# Patient Record
Sex: MALE | Race: BLACK OR AFRICAN AMERICAN | Employment: UNEMPLOYED | URBAN - METROPOLITAN AREA
[De-identification: names, ages, dates, MRNs, and addresses within clinical notes are randomized per-mention and may not be internally consistent; named-entity substitution may affect disease eponyms.]

---

## 2020-04-07 PROBLEM — F29 UNSPECIFIED PSYCHOSIS NOT DUE TO A SUBSTANCE OR KNOWN PHYSIOLOGICAL CONDITION (HCC): Status: ACTIVE | Noted: 2020-04-07

## 2020-04-08 PROBLEM — J45.20 MILD INTERMITTENT ASTHMA: Status: ACTIVE | Noted: 2020-04-08

## 2020-04-08 PROBLEM — J45.20 MILD INTERMITTENT ASTHMA (HCC): Status: ACTIVE | Noted: 2020-04-08

## 2020-05-04 PROBLEM — F29 PSYCHOSIS (HCC): Status: ACTIVE | Noted: 2020-05-04

## 2020-05-05 PROBLEM — E80.6 HYPERBILIRUBINEMIA: Status: ACTIVE | Noted: 2020-05-05

## 2020-05-07 PROBLEM — F31.32 BIPOLAR AFFECTIVE DISORDER, CURRENTLY DEPRESSED, MODERATE (HCC): Status: ACTIVE | Noted: 2020-05-04

## 2020-05-13 PROBLEM — F31.64 BIPOLAR DISORDER, CURR EPISODE MIXED, SEVERE, WITH PSYCHOTIC FEATURES (HCC): Status: ACTIVE | Noted: 2020-05-13

## 2020-05-17 PROBLEM — F31.9 BIPOLAR 1 DISORDER (HCC): Status: ACTIVE | Noted: 2020-05-17

## 2020-05-17 NOTE — BH PROGRESS NOTE
Pt presented to [facility] with mother for assessment d/t pt reporting he wanted his medications looked at. Upon arrival to [facility], pt mother stated she would like to take pt to ER for evaluation and not be assessed at [facility]. Pt nor pt mother denied safety concerns.  E

## 2020-05-18 PROBLEM — E80.6 HYPERBILIRUBINEMIA: Status: RESOLVED | Noted: 2020-05-05 | Resolved: 2020-05-18

## 2020-05-18 NOTE — ED PROVIDER NOTES
Patient Seen in: [facility] Emergency Department      History   Patient presents with:  Eval-P    Stated Complaint:     HPI     Patient is a 79-year-old male who recently admitted to [facility].  Patient states he had been prescribed Zypre motion, no tenderness, good capillary refill. SKIN: No rash, good turgor. NEURO: Patient answers questions appropriately. No focal deficits appreciated.   Conversant         ED Course     Labs Reviewed   URINALYSIS WITH CULTURE REFLEX - Abnormal; Notable

## 2020-05-18 NOTE — BH LEVEL OF CARE ASSESSMENT
Level of Care Assessment Note    General Questions  Why are you here?: \"I'm here for suicidal thoughts. I'm scared of my thought\"  Precipitating Events: Pt reported thoughts to kill himself for the last few day.  Pt reported that he believes that it was t reported he is a fighter and wants to live. Past Suicidal Ideation: Ideation  Describe: Pt reported hx of suicidal thoughts prior to being prescribed depakote but feels the medication made it worse.   Family History or Personal Lived Experience of Loss or Symptoms: Panic attack; Shaking;Palpitations  Panic Attacks: Pt reported panic attacks multiple time per week.   Trauma Reaction: Flashbacks(Pt reported PTSD.)  Bipolar Symptoms: Increased risk taking  Bipolar Description: Pt reported I don't know \"not list Symptoms: Denies past symptoms  Last Withdrawal Episode: n/a  Current Withdrawal Symptoms: No  Breathalyzer: (BAL 0)    Compulsive Behaviors  Are you/others concerned about any of the following behaviors over the past 30 days?: Other (comment)(Pt reported Co-operative  Speech  Rate of Speech: Appropriate  Flow of Speech: Appropriate  Intensity of Volume: Ordinary  Clarity: Clear  Cognition  Concentration: Unimpaired  Memory: Recent memory intact; Remote memory intact  Orientation Level: Oriented X4  Insight: receptive to the recommendation for inpatient treatment. Risk/Protective Factors  Risk Factors: Current suicidal behavior;Current/past psychiatric disorder;Access to lethal means; Change in treatment;Substance use or abuse;Pattern of impulsive decision m

## 2020-05-18 NOTE — ED NOTES
Patient remains calm and cooperative.   EMS here for transport to [facility].  VSS, stable for transport

## 2020-05-24 ENCOUNTER — APPOINTMENT (OUTPATIENT)
Dept: GENERAL RADIOLOGY | Facility: HOSPITAL | Age: 25
End: 2020-05-24
Attending: EMERGENCY MEDICINE
Payer: COMMERCIAL

## 2020-05-24 ENCOUNTER — APPOINTMENT (OUTPATIENT)
Dept: CT IMAGING | Facility: HOSPITAL | Age: 25
End: 2020-05-24
Attending: EMERGENCY MEDICINE
Payer: COMMERCIAL

## 2020-05-24 ENCOUNTER — HOSPITAL ENCOUNTER (EMERGENCY)
Facility: HOSPITAL | Age: 25
Discharge: HOME | End: 2020-05-24
Attending: EMERGENCY MEDICINE
Payer: COMMERCIAL

## 2020-05-24 VITALS
BODY MASS INDEX: 22 KG/M2 | DIASTOLIC BLOOD PRESSURE: 70 MMHG | TEMPERATURE: 98 F | OXYGEN SATURATION: 100 % | RESPIRATION RATE: 18 BRPM | WEIGHT: 145 LBS | HEART RATE: 77 BPM | SYSTOLIC BLOOD PRESSURE: 104 MMHG

## 2020-05-24 DIAGNOSIS — R51.9 ACUTE NONINTRACTABLE HEADACHE, UNSPECIFIED HEADACHE TYPE: Primary | ICD-10-CM

## 2020-05-24 PROCEDURE — 81003 URINALYSIS AUTO W/O SCOPE: CPT | Performed by: EMERGENCY MEDICINE

## 2020-05-24 PROCEDURE — 82550 ASSAY OF CK (CPK): CPT | Performed by: EMERGENCY MEDICINE

## 2020-05-24 PROCEDURE — 80053 COMPREHEN METABOLIC PANEL: CPT | Performed by: EMERGENCY MEDICINE

## 2020-05-24 PROCEDURE — 70450 CT HEAD/BRAIN W/O DYE: CPT | Performed by: EMERGENCY MEDICINE

## 2020-05-24 PROCEDURE — 99284 EMERGENCY DEPT VISIT MOD MDM: CPT

## 2020-05-24 PROCEDURE — 96374 THER/PROPH/DIAG INJ IV PUSH: CPT

## 2020-05-24 PROCEDURE — 85025 COMPLETE CBC W/AUTO DIFF WBC: CPT | Performed by: EMERGENCY MEDICINE

## 2020-05-24 PROCEDURE — 71045 X-RAY EXAM CHEST 1 VIEW: CPT | Performed by: EMERGENCY MEDICINE

## 2020-05-24 PROCEDURE — 96361 HYDRATE IV INFUSION ADD-ON: CPT

## 2020-05-24 PROCEDURE — 96375 TX/PRO/DX INJ NEW DRUG ADDON: CPT

## 2020-05-24 RX ORDER — LORAZEPAM 2 MG/ML
1 INJECTION INTRAMUSCULAR ONCE
Status: COMPLETED | OUTPATIENT
Start: 2020-05-24 | End: 2020-05-24

## 2020-05-24 RX ORDER — DIPHENHYDRAMINE HYDROCHLORIDE 50 MG/ML
25 INJECTION INTRAMUSCULAR; INTRAVENOUS ONCE
Status: COMPLETED | OUTPATIENT
Start: 2020-05-24 | End: 2020-05-24

## 2020-05-24 RX ORDER — METOCLOPRAMIDE HYDROCHLORIDE 5 MG/ML
10 INJECTION INTRAMUSCULAR; INTRAVENOUS ONCE
Status: COMPLETED | OUTPATIENT
Start: 2020-05-24 | End: 2020-05-24

## 2020-05-24 RX ORDER — KETOROLAC TROMETHAMINE 30 MG/ML
15 INJECTION, SOLUTION INTRAMUSCULAR; INTRAVENOUS ONCE
Status: COMPLETED | OUTPATIENT
Start: 2020-05-24 | End: 2020-05-24

## 2020-05-25 NOTE — ED NOTES
Pt seen restless in bed, pressured speech, hyperventilating, constant leg movements,  \"I can't stay still, my pain is 100. \" Pt given Benadryl as ordered

## 2020-05-25 NOTE — ED PROVIDER NOTES
Patient Seen in: [facility] Emergency Department      History   Patient presents with:  Headache  Nausea/Vomiting/Diarrhea    Stated Complaint: Headache; Nausea    HPI    Presents with headache.   The patient states that he has had headaches for a lo following components:       Result Value    Glucose 102 (*)     All other components within normal limits   CBC W/ DIFFERENTIAL - Abnormal; Notable for the following components:    HGB 12.5 (*)     MCV 78.2 (*)     MCH 23.9 (*)     MCHC 30.6 (*)     All ot Margarita Johnson MD on 5/24/2020 at 10:57 PM          Xr Chest Ap Portable  (cpt=71045)    Result Date: 5/24/2020  PROCEDURE:  XR CHEST AP PORTABLE  (CPT=71045)  TECHNIQUE:  AP chest radiograph was obtained. COMPARISON:  None.   INDICATIONS:  cough  PATIENT STATED H diagnosis)    Disposition:  Discharge  5/24/2020 11:31 pm    Follow-up:  Vadim Bryant MD  [address] [place]    Schedule an appointment as soon as possible for a visit in 2 days      Stephanie Vargas MD

## 2020-05-25 NOTE — ED NOTES
Pt states, \"it's not working, I still can't stay still. I get panic attacks like this everyday. \" IV Ativan given as ordered

## 2020-05-26 NOTE — ED NOTES
Pt presented to the ED due to concerns of increased anxiety. Pt denies SI/HI. Spoke with pt's mother who reports concerns that pt is frequently anxious and wanting to come to the ED. She reported pt has not slept since his last visit to the ED.  Pt's mother

## 2020-05-26 NOTE — ED NOTES
Spoke with ANA, patient okay to be discharged home. Per MD patient is okay to go home. Discharge instructions reviewed with patient and patient's mother. Patient will follow up with PHP. Patient given prescription.  Patient is cooperative, denies any SI/HI,

## 2020-05-26 NOTE — ED PROVIDER NOTES
Patient Seen in: [facility] Emergency Department      History   Patient presents with:   Anxiety/Panic attack    Stated Complaint: anxiety    HPI    43-year-old male diagnosed bipolar recently discharged last Tuesday from [facility] after admission noted in HPI. Constitutional and vital signs reviewed. All other systems reviewed and negative except as noted above.     Physical Exam     ED Triage Vitals [05/25/20 1950]   /72   Pulse 79   Resp 18   Temp 98.7 °F (37.1 °C)   Temp src Temporal last 2 partial hospitalization program appointments, and discussing with the psych liaison that it is there where he would have had his anxiety addressed rather than here in the ED today, went back and discussed with mom if they are agreeable to go tomorro

## 2020-05-26 NOTE — ED NOTES
Per pts mother, patient received lorazapam yesterday which helped his anxiety and helped him sleep but that psychiatrist wants to see patient for a follow up appointment prior to prescribing medication.

## 2020-05-26 NOTE — ED NOTES
Per MD discharge on hold until patient is reassessed by [facility].

## 2020-05-26 NOTE — ED INITIAL ASSESSMENT (HPI)
Patient was discharged from [facility] 4 days ago, is currently in the day program. Patient was placed on Depakote and patient felt sick on the medication so took himself off the medication.  Patient reports that his anxiety is bad and is causing him a headache 10

## 2020-05-26 NOTE — ED NOTES
Patient's mother requesting for a recommendation for a medication to help patient sleep tonight and that patient will then follow up with [facility] PHP tomorrow.

## 2020-05-26 NOTE — ED NOTES
Updated MD regarding what was discussed with patient's mother that patient has very bad anxiety and that lorazapam helped him but that she is unable to get a prescription for it as psychiatrist wants a follow up appointment first however it is difficult to

## 2021-04-09 DIAGNOSIS — Z23 NEED FOR VACCINATION: ICD-10-CM

## 2021-06-20 NOTE — ED PROVIDER NOTES
Patient is calm and cooperative.   We are waiting official transport to [facility]

## 2021-06-20 NOTE — ED QUICK NOTES
Pt changed into hospital gown and socks. All belongings bagged and labeled. Pt moved to front of nursing station to be monitored.

## 2021-06-20 NOTE — ED NOTES
Pt accepted to [facility] under Dr. Simin Angulo. Will be calling for RN to RN within the next hour or two.

## 2021-06-20 NOTE — ED QUICK NOTES
Report received from [facility]. Pt asleep and appears in no distress. Arturo Dominguez  (RN)  2020 17:16:33

## 2021-06-20 NOTE — ED NOTES
Spoke with Hanny Mcdonald at [facility] who stated that she was able speak with Nurse and transferred her call so that RN to RN can take place.
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
## 2021-06-20 NOTE — ED NOTES
Spoke with Gregg Concepcion to inform him that there is no bed availability at [facility] and that he will be transferred to a different hospital for in-patient. Discharge  informed Gregg Concepcion that he will be updated on transfer status.  Bronson expressed u

## 2021-06-20 NOTE — ED QUICK NOTES
Nurse attempt to call sheila. Spoke with Adriana Calloway. Nurse to nurse still unavailable due to emergencies on the unit.

## 2021-06-20 NOTE — PROGRESS NOTES
06/20/21 0025   Advance Directives for Healthcare   Does the patient have:   Other (Comment)  (Catarina Azar states he doesn't know if he has a Legal Guardian; none noted in previous assessments)   Healthcare Agent Appointed Keely Box denies having a Health
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
## 2021-06-20 NOTE — ED QUICK NOTES
Bhavesh Stanley called and notified nurse to call in 30minutes due to nurse at [facility] unable to take report at this time.

## 2021-06-20 NOTE — ED QUICK NOTES
Report to Mireille Vanegas RN. Pt remains asleep and in no distress. Per Dr. Nallely Alcaraz, pt has been accepted at [facility] and will be transferred there later today when bed is available.

## 2021-06-20 NOTE — PROGRESS NOTES
06/20/21 0026   COVID Exposure Risk Screening   Have you been practicing social distancing? Yes   Have you been wearing a mask when in the community?  No   Describe Bronson states he doesn't wear a mask   Are the people you live with following social dis

## 2021-06-20 NOTE — ED NOTES
Spoke with Bronson to inform him of acceptance to [facility] and waiting on RN to RN to be completed.

## 2021-06-20 NOTE — ED NOTES
Spoke with Lenoard Leyden at [facility] who stated that their Nurse will be calling for RN to RN within the next 15 minutes. Lenoard Leyden stated that the Nurse was responding to an emergency on a unit.

## 2021-06-20 NOTE — ED PROVIDER NOTES
Patient Seen in: [facility] Emergency Department      History   Patient presents with:  Eval-P    Stated Complaint: sore throat    HPI/Subjective:   HPI    Patient is a 43-year-old male who reports that he has been diagnosed with schizophrenia in th Exam  Vitals and nursing note reviewed. Constitutional:       Appearance: He is well-developed. HENT:      Head: Normocephalic and atraumatic.    Eyes:      Conjunctiva/sclera: Conjunctivae normal.      Pupils: Pupils are equal, round, and reactive to l Amphetamine Urine Presumed Positive (*)     Cannabinoid Urine Presumed Positive (*)     Ecstasy Urine Presumed Positive (*)     All other components within normal limits    Narrative:     Results of the Urine Drug Screen should be used only for medical pur Abnormality         Status                     ---------                               -----------         ------                     CBC W/ DIFFERENTIAL[[id number]]          Abnormal            Final result                 Please view resu here.                     Disposition and Plan     Clinical Impression:  Bipolar affective disorder, remission status unspecified ([facility]  (primary encounter diagnosis)  Paranoia ([facility]     Disposition:  Psychiatric transfer  6/20/2021  1:54 am    Follow-up:

## 2021-06-20 NOTE — ED QUICK NOTES
[facility] at [facility]. Pt cleared for discharge. Pt calm and cooperative. Belongings returned to paramedics.

## 2021-06-20 NOTE — ED PROVIDER NOTES
Spoke with Loreto Arana at [facility] who stated that RN to RN will be completed within the next 5 minutes

## 2021-06-20 NOTE — ED NOTES
Spoke with Nurse who stated that [facility] called for RN to RN. Merrick needed to call back due to responding to a code.

## 2021-11-02 ENCOUNTER — TELEPHONE (OUTPATIENT)
Dept: BEHAVIORAL HEALTH | Age: 26
End: 2021-11-02

## 2021-11-02 ENCOUNTER — OFFICE VISIT (OUTPATIENT)
Dept: URGENT CARE | Age: 26
End: 2021-11-02

## 2021-11-02 VITALS
OXYGEN SATURATION: 97 % | DIASTOLIC BLOOD PRESSURE: 71 MMHG | SYSTOLIC BLOOD PRESSURE: 144 MMHG | RESPIRATION RATE: 16 BRPM | TEMPERATURE: 98.1 F | HEART RATE: 69 BPM

## 2021-11-02 DIAGNOSIS — Z76.0 ENCOUNTER FOR MEDICATION REFILL: ICD-10-CM

## 2021-11-02 DIAGNOSIS — F90.9 ATTENTION DEFICIT HYPERACTIVITY DISORDER (ADHD), UNSPECIFIED ADHD TYPE: Primary | ICD-10-CM

## 2021-11-02 PROCEDURE — 99214 OFFICE O/P EST MOD 30 MIN: CPT | Performed by: EMERGENCY MEDICINE

## 2021-11-02 ASSESSMENT — PAIN SCALES - GENERAL: PAINLEVEL: 0

## 2021-11-05 ENCOUNTER — TELEPHONE (OUTPATIENT)
Dept: FAMILY MEDICINE | Age: 26
End: 2021-11-05

## 2022-01-26 ENCOUNTER — APPOINTMENT (OUTPATIENT)
Dept: BEHAVIORAL HEALTH | Age: 27
End: 2022-01-26

## 2022-02-23 ENCOUNTER — APPOINTMENT (OUTPATIENT)
Dept: BEHAVIORAL HEALTH | Age: 27
End: 2022-02-23

## 2024-02-25 ENCOUNTER — HOSPITAL ENCOUNTER (EMERGENCY)
Facility: HOSPITAL | Age: 29
Discharge: HOME | End: 2024-02-25
Attending: EMERGENCY MEDICINE
Payer: COMMERCIAL

## 2024-02-25 ENCOUNTER — HOSPITAL ENCOUNTER (EMERGENCY)
Age: 29
Discharge: LEFT BEFORE DISCHARGE | End: 2024-02-25
Payer: COMMERCIAL

## 2024-02-25 VITALS
HEART RATE: 86 BPM | SYSTOLIC BLOOD PRESSURE: 122 MMHG | BODY MASS INDEX: 31.5 KG/M2 | HEIGHT: 70 IN | TEMPERATURE: 98 F | OXYGEN SATURATION: 98 % | DIASTOLIC BLOOD PRESSURE: 92 MMHG | RESPIRATION RATE: 16 BRPM | WEIGHT: 220 LBS

## 2024-02-25 DIAGNOSIS — F25.9 SCHIZOAFFECTIVE DISORDER, UNSPECIFIED TYPE (HCC): ICD-10-CM

## 2024-02-25 DIAGNOSIS — R44.3 HALLUCINATIONS: Primary | ICD-10-CM

## 2024-02-25 LAB
ALBUMIN SERPL-MCNC: 3.9 G/DL (ref 3.4–5)
ALBUMIN/GLOB SERPL: 1 {RATIO} (ref 1–2)
ALP LIVER SERPL-CCNC: 87 U/L
ALT SERPL-CCNC: 49 U/L
AMPHET UR QL SCN: NEGATIVE
ANION GAP SERPL CALC-SCNC: 3 MMOL/L (ref 0–18)
APAP SERPL-MCNC: <2 UG/ML (ref 10–30)
AST SERPL-CCNC: 39 U/L (ref 15–37)
ATRIAL RATE: 77 BPM
BASOPHILS # BLD AUTO: 0.05 X10(3) UL (ref 0–0.2)
BASOPHILS NFR BLD AUTO: 0.7 %
BENZODIAZ UR QL SCN: NEGATIVE
BILIRUB SERPL-MCNC: 0.7 MG/DL (ref 0.1–2)
BUN BLD-MCNC: 12 MG/DL (ref 9–23)
CALCIUM BLD-MCNC: 9.5 MG/DL (ref 8.5–10.1)
CHLORIDE SERPL-SCNC: 107 MMOL/L (ref 98–112)
CO2 SERPL-SCNC: 27 MMOL/L (ref 21–32)
COCAINE UR QL: NEGATIVE
CREAT BLD-MCNC: 0.75 MG/DL
CREAT UR-SCNC: 161 MG/DL
EGFRCR SERPLBLD CKD-EPI 2021: 126 ML/MIN/1.73M2 (ref 60–?)
EOSINOPHIL # BLD AUTO: 0.17 X10(3) UL (ref 0–0.7)
EOSINOPHIL NFR BLD AUTO: 2.4 %
ERYTHROCYTE [DISTWIDTH] IN BLOOD BY AUTOMATED COUNT: 13.1 %
ETHANOL SERPL-MCNC: <3 MG/DL (ref ?–3)
GLOBULIN PLAS-MCNC: 4.1 G/DL (ref 2.8–4.4)
GLUCOSE BLD-MCNC: 97 MG/DL (ref 70–99)
HCT VFR BLD AUTO: 43.5 %
HGB BLD-MCNC: 13.5 G/DL
IMM GRANULOCYTES # BLD AUTO: 0.05 X10(3) UL (ref 0–1)
IMM GRANULOCYTES NFR BLD: 0.7 %
LYMPHOCYTES # BLD AUTO: 2.88 X10(3) UL (ref 1–4)
LYMPHOCYTES NFR BLD AUTO: 41.4 %
MCH RBC QN AUTO: 23.6 PG (ref 26–34)
MCHC RBC AUTO-ENTMCNC: 31 G/DL (ref 31–37)
MCV RBC AUTO: 76 FL
MDMA UR QL SCN: NEGATIVE
MONOCYTES # BLD AUTO: 0.48 X10(3) UL (ref 0.1–1)
MONOCYTES NFR BLD AUTO: 6.9 %
NEUTROPHILS # BLD AUTO: 3.32 X10 (3) UL (ref 1.5–7.7)
NEUTROPHILS # BLD AUTO: 3.32 X10(3) UL (ref 1.5–7.7)
NEUTROPHILS NFR BLD AUTO: 47.9 %
OPIATES UR QL SCN: NEGATIVE
OSMOLALITY SERPL CALC.SUM OF ELEC: 284 MOSM/KG (ref 275–295)
OXYCODONE UR QL SCN: NEGATIVE
P AXIS: 28 DEGREES
P-R INTERVAL: 182 MS
PLATELET # BLD AUTO: 302 10(3)UL (ref 150–450)
POTASSIUM SERPL-SCNC: 4.1 MMOL/L (ref 3.5–5.1)
PROT SERPL-MCNC: 8 G/DL (ref 6.4–8.2)
Q-T INTERVAL: 362 MS
QRS DURATION: 82 MS
QTC CALCULATION (BEZET): 409 MS
R AXIS: -8 DEGREES
RBC # BLD AUTO: 5.72 X10(6)UL
SALICYLATES SERPL-MCNC: <1.7 MG/DL (ref 2.8–20)
SODIUM SERPL-SCNC: 137 MMOL/L (ref 136–145)
T AXIS: 1 DEGREES
VENTRICULAR RATE: 77 BPM
WBC # BLD AUTO: 7 X10(3) UL (ref 4–11)

## 2024-02-25 PROCEDURE — 80143 DRUG ASSAY ACETAMINOPHEN: CPT | Performed by: EMERGENCY MEDICINE

## 2024-02-25 PROCEDURE — 93010 ELECTROCARDIOGRAM REPORT: CPT

## 2024-02-25 PROCEDURE — 36415 COLL VENOUS BLD VENIPUNCTURE: CPT

## 2024-02-25 PROCEDURE — 80307 DRUG TEST PRSMV CHEM ANLYZR: CPT | Performed by: EMERGENCY MEDICINE

## 2024-02-25 PROCEDURE — 93005 ELECTROCARDIOGRAM TRACING: CPT

## 2024-02-25 PROCEDURE — 80053 COMPREHEN METABOLIC PANEL: CPT | Performed by: EMERGENCY MEDICINE

## 2024-02-25 PROCEDURE — 85025 COMPLETE CBC W/AUTO DIFF WBC: CPT | Performed by: EMERGENCY MEDICINE

## 2024-02-25 PROCEDURE — 99285 EMERGENCY DEPT VISIT HI MDM: CPT

## 2024-02-25 PROCEDURE — 99284 EMERGENCY DEPT VISIT MOD MDM: CPT

## 2024-02-25 PROCEDURE — 82077 ASSAY SPEC XCP UR&BREATH IA: CPT | Performed by: EMERGENCY MEDICINE

## 2024-02-25 PROCEDURE — 80179 DRUG ASSAY SALICYLATE: CPT | Performed by: EMERGENCY MEDICINE

## 2024-02-25 RX ORDER — LORAZEPAM 1 MG/1
1 TABLET ORAL ONCE
Status: COMPLETED | OUTPATIENT
Start: 2024-02-25 | End: 2024-02-25

## 2024-02-25 RX ORDER — ARIPIPRAZOLE 10 MG/1
15 TABLET ORAL DAILY
COMMUNITY
Start: 2024-02-14

## 2024-02-25 NOTE — ED INITIAL ASSESSMENT (HPI)
Patient here with c/o hallucinations.  Per mother, patient has hx of psychosis.  Patient was reporting passing out and blood clots/swelling in his legs.  Mother reports no one has seen her pass out.  Patient did drink alcohol and smoke marijuana  last night and is concerned that may have been interfering with with his medication.  Denies SI/HI.

## 2024-02-25 NOTE — ED QUICK NOTES
Per Dr Lopez, no Petition or Cert or seclusion documentation necessary. Pt is here to be medically cleared due to medication change.

## 2024-02-25 NOTE — ED QUICK NOTES
Patient and parents updated on POC. Offered breakfast to patient, declined at this time. Informed mom of assessment ETA.

## 2024-02-25 NOTE — DISCHARGE INSTRUCTIONS
Therapy resources:    Jia Meredith  Therapist ([employer] [facility] [facility])  [address] [place] [address], [address], [place], [place], [address]  [email or web address]  [phone]. Extension: [phone]      Smith Alcantar  Therapist ([facility])  [address], [facility], [place], [address]  [email or web address]  [phone]. Extension: [phone]      Harrison Geller  Therapist ([facility])  [address] [place] [address], [address], [place], [place], [address]  [email or web address]  [phone]. Extension:

## 2024-02-25 NOTE — BH LEVEL OF CARE ASSESSMENT
Crisis Evaluation Assessment    Bronson Pinzon YOB: 1995   Age 29 year old MRN [id number]   [facility] EMERGENCY DEPARTMENT Attending Colt Lopez MD      Patient's legal sex: male  Patient identifies as: male  Patient's birth sex: male  Preferred pronouns: HE/Him    Date of Service: 2/25/2024    Referral Source:  Referral Source  Where was crisis eval performed?: On-site  Referral Source: Friend/Relative  Referral Source Info: Parents brought this pt to ED for medical clearance    Reason for Crisis Evaluation   \"Hallucinating\", Pt admits taking his psych medications with alcohol and marijuana last night. Pt reports to have a hx of hallucinating every single day. Pt reports hallucinating more than usual last night. Pt denies commanding hallucinations.       Collateral  Pt's both parents were at bedside and provided collateral.   Per mother, pt has a hx of Bipolar do, Schizophrenia, ADHD and PTSD.  My son is struggling with mental health issues for the past 7 years. He has a history of hallucinating, he hallucinates every single day and I am well aware of taking care of his needs at home. He has started seeing a new psychiatrist and she has changed some of his medications, roughly 2 weeks ago. Last night I noticed him hallucinating more than usual.  I got concerned because I thought may be because of new medications he is having increased hallucinations and probably there could be any other medical concern. He told me he passed out last night, I did not see him passing out. I thought he may have blood clots caused by new meds that is why I brought him to ED today for medical checkup. I am not concerned about his mental health and I am completely taking care of him at home. He has been compliant with his prescribed medications currently and seeing Lidia BAILEY at [facility]. He has a hx of being non complaint with his treatment in past and sometimes it could be very challenging and a struggle  to make sure he is taking his medications. He will be seeing the psychiatrist again February 27, 2028.   After looking at his labs I was surprised that he was positive for alcohol and marijuana which makes sense why he was having increased hallucinations.  I wish I knew that he was using alcohol and marijuana with his psych meds before bringing him over to ED.   He does not have history of depression, anxiety, HI or SI. At times, he does get verbally aggressive but he is not physically aggressive towards property or any of us. He does not have commanding hallucinations and easily gets paranoid.   He has a history of inpatient mental health treatment and IOP few years ago for psychosis. He also has a history of Adderall addiction. He is unable to carry a job or perform his ADLs independently but I am available to help him at home. His appetite and sleep varies. No signs of current psychosis.   I do not have any safety concerns to bring him back home today after his medical clearance.        Suicide Crisis Syndrome:  Suicide Crisis Syndrome  Do you feel trapped with no good options left?: No  Are you overwhelmed, or have you lost control by negative thoughts filling your head? : No    Suicide Risk Screening:  Source of information for CSSR: Patient;Collateral  In what setting is the screener performed?: in person  1. Have you wished you were dead or wished you could go to sleep and not wake up? (past 30 days): No  2. Have you actually had any thoughts of killing yourself? (past 30 days): No  3. Have you been thinking about how you might kill yourself? (past 30 days): No  4. Have you had these thoughts and had some intention of acting on them? (past 30 days): No  5a. Have you started to work out or worked out the details of how to kill yourself? (past 30 days): No  5b. Do you intend to carry out this plan? (past 30 days): No  6. Have you ever done anything, started to do anything, or prepared to do anything to end your  life? (lifetime): No     Score - BH OV: No Risk    Suicide Risk Assessments:  Suicidal Thoughts, Plan and Intent (this information to be used in conjunction with CSSR-S Suicide Screening)  Describe thoughts, ideation and intent:: Pt and parents at bedside deny SI/attempts/thoughts.        Non-Suicidal Self-Injury:   Patient denies any nonsuicidal self-injury.    Risk to Others  Patient denies HI or aggression towards others.  Per mother patient has a tendency to get verbally aggressive towards others but he has never been physically aggressive towards others or property. He has never done any damages to property.    Access to Means:  Access to Means  Has access to means to attempt suicide, self-injure, harm others, or damage property?: No  Access to Firearm/Weapon: No  Do you have a firearm owner identification (FOID) card?: No    Protective Factors:        Review of Psychiatric Systems:  Per mother patient has a history of schizophrenia ADHD bipolar I disorder and PTSD.  He has history of hallucinating every single day but none of them are commanding hallucinations. He does not have a history of depression, anxiety or any current psychosis.  He has issues with his sleep off and on.  No issues with appetite.      Substance Use:  Per labs pt is + for cannabis.  Pt admits taking his psych medications with alcohol and marijuana last night.   Per mother pt has a hx of abusing adderall.       Functional Achievement:   Per mother patient is unemployed and lives with her at home. Patient needs help with his ADLs.      Ability to Care for Self::   Per mother patient does have off-and-on ability to care for self.      Current Treatment and Treatment History:  Per mother patient does have a history of inpatient and outpatient mental health treatment.  He is seeing a psychiatrist nurse practitioner Pauline Murillo at [place], next appointment is on February 27.    School/Work Performance:  Per mother due to his mental health he is  unable to maintain a job and his duties.      Relevant Social History:  Per mother patient lives with her full-time and she is not aware of any legal history.      Simone and Complex (as applicable):      Current Medical (as applicable):       EDP Assessment (as applicable):  IBW Calculations  Weight: 220 lb  BMI (Calculated): 31.6  IBW LBS [place]: 166 LBS  IBW %: 132.53 %  IBW + 10%: 182.6 LBS  IBW - 10%: 149.4 LBS       Abuse Assessment:  Abuse Assessment  Physical Abuse: Denies  Verbal Abuse: Denies  Sexual Abuse: Denies  Neglect: Denies  Does anyone say or do something to you that makes you feel unsafe?: No  Have You Ever Been Harmed by a Partner/Caregiver?: No  Health Concerns r/t Abuse: No    Mental Status Exam:   General Appearance  Characteristics: Appropriate clothing  Eye Contact: No contact  Psychomotor Behavior  Gait/Movement: Normal  Abnormal movements: None  Posture: Relaxed  Rate of Movement: Normal  Mood and Affect  Mood or Feelings: Calm  Range of Affect: Flat  Stability of Affect: Stable  Attitude toward staff: Co-operative  Speech  Rate of Speech: Appropriate  Flow of Speech: Appropriate  Intensity of Volume: Ordinary  Clarity: Clear  Cognition  Concentration: Unimpaired  Orientation Level: Oriented to person;Oriented to place;Oriented to time  Insight: Poor  Judgment: Poor  Thought Patterns  Content: Hallucinations  Level of Consciousness: Alert  Type of Hallucination: Auditory  Level of Consciousness: Alert  Behavior  Exhibited behavior: Participated      Disposition:    Rationale for Treatment Recommendation:   29 years old male pt was brought to [place] ED for medical check up only by both parents.   Pt admits taking his psych medications with alcohol and marijuana last night. Pt reports to have a hx of hallucinating every single day. Pt reports hallucinating more than usual last night. Pt denies commanding hallucinations. Pt denies SI/HI. CSSRs score is 0.       Pt's both parents were at bedside  and provided collateral.   Per mother, pt has a hx of Bipolar do, Schizophrenia, ADHD and PTSD.  My son is struggling with mental health issues for the past 7 years. He has a history of hallucinating, he hallucinates every single day and I am well aware of taking care of his needs at home. He has started seeing a new psychiatrist and she has changed some of his medications, roughly 2 weeks ago. Last night I noticed him hallucinating more than usual.  I got concerned because I thought may be because of new medications he is having increased hallucinations and probably there could be any other medical concern. He told me he passed out last night, I did not see him passing out. I thought he may have blood clots caused by new meds that is why I brought him to ED today for medical checkup. I am not concerned about his mental health and I am completely taking care of him at home. He has been compliant with his prescribed medications currently and seeing Lidia BAILEY at [facility]. He has a hx of being non complaint with his treatment in past and sometimes it could be very challenging and a struggle to make sure he is taking his medications. He will be seeing the psychiatrist again February 27, 2028.   After looking at his labs I was surprised that he was positive for alcohol and marijuana which makes sense why he was having increased hallucinations.  I wish I knew that he was using alcohol and marijuana with his psych meds before bringing him over to ED.   He does not have history of depression, anxiety, HI or SI. At times, he does get verbally aggressive but he is not physically aggressive towards property or any of us. He does not have commanding hallucinations and easily gets paranoid.   He has a history of inpatient mental health treatment and IOP few years ago for psychosis. He also has a history of Adderall addiction. He is unable to carry a job or perform his ADLs independently but I am available to help him at home. His  appetite and sleep varies. No signs of current psychosis.   I do not have any safety concerns to bring him back home today after his medical clearance.      Level of Care Recommendations  Consulted with: Dr. Willett  Level of Care Recommendation: Outpatient (OP follow up with psych, NP, Pauline Murillo @ [facility].)  Education Provided: Call 911 in an Emergency;[facility] Crisis Line Number;Advised to call if condition worsens;Advised to call with questions         Diagnoses with F-Codes:  Primary Psychiatric Diagnosis  Bipolar I do. Li RICO

## 2024-02-25 NOTE — ED PROVIDER NOTES
Patient Seen in: [facility] Emergency Department      History     Chief Complaint   Patient presents with    Eval-P     Per mother patient is hearing voices     Stated Complaint: eval P    Subjective:   HPI  29-year-old male brought in by family due to report that he has been hearing voices.  Mom says he has been hallucinating but has not been a threat to himself or others.  He did drink alcohol and recently had new medications added to his treatment plan.  He had been off of medications for extensive period time and they have establish care with a new psychiatrist.  He has been on new medication for the past 2 weeks and mom says that he has been having some hallucinations.  The patient has been saying that he been having swelling in his legs and passing out and claims that he was eating noodles to treat undiagnosed hypertension.  Reviewing his records he has had an office visit on 2/14/2024 for schizoaffective disorder and bipolar disorder was seen here in the emergency department on 6/19/2021 was admitted at that time.  Mom says he has not escalated behaviors to a point where she believes he needs hospitalization but would still like an evaluation by Surjit Ayala.  She says she just wants something to help him with some of his anxiety.      Objective:   Past Medical History:   Diagnosis Date    ADHD     Anxiety     Bipolar affective (HCC)               History reviewed. No pertinent surgical history.             Social History     Socioeconomic History    Marital status: Single   Tobacco Use    Smoking status: Never    Smokeless tobacco: Never   Vaping Use    Vaping Use: Never used   Substance and Sexual Activity    Alcohol use: Yes     Alcohol/week: 1.0 - 2.0 standard drink of alcohol     Types: 1 - 2 Cans of beer per week    Drug use: Not Currently     Types: Cannabis     Comment: Pt states \"I have no money to smoke anymore\" MRE a month ago per pt              Review of Systems    Positive for stated  complaint: eval P  Other systems are as noted in HPI.  Constitutional and vital signs reviewed.      All other systems reviewed and negative except as noted above.    Physical Exam     ED Triage Vitals [02/25/24 0452]   BP (!) 122/92   Pulse 86   Resp 16   Temp 98.1 °F (36.7 °C)   Temp src Oral   SpO2 98 %   O2 Device None (Room air)       Current:BP (!) 122/92   Pulse 86   Temp 98.1 °F (36.7 °C) (Oral)   Resp 16   Ht 177.8 cm (5' 10\")   Wt 99.8 kg   SpO2 98%   BMI 31.57 kg/m²         Physical Exam  General: This a pleasant nontoxic appearing patient in no apparent distress.  He is calm and cooperative.  HEENT: Pupils are equal reactive to light.  Extra ocular motions are intact.  No scleral icterus or conjunctival pallor: Neck is supple without tenderness on palpation.  Head is atraumatic normocephalic.  Oral mucosa moist.  Tongue is midline.  No posterior pharyngeal lesions.   Lungs: Clear to auscultation bilaterally.  No wheezes, rhonchi, or rales appreciated.  No accessory muscle use noted for breathing.  Cardiac: Regular rate and rhythm.  Normal S1 and 2 without murmurs or ectopy appreciated  Abdomen: Soft on examination without tenderness to deep palpation or to percussion.  No masses appreciated.  Bowel sounds are normoactive.  No CVA tenderness.  Extremities: No cyanosis, no edema or clubbing.  Pulses are +2.  Full range of motion is noted of the extremities without deformities.  No tenderness.  Neurologically intact.       ED Course     Labs Reviewed   COMP METABOLIC PANEL (14) - Abnormal; Notable for the following components:       Result Value    AST 39 (*)     All other components within normal limits   DRUG SCREEN 7 W/OUT CONFIRMATION, URINE - Abnormal; Notable for the following components:    Cannabinoid Urine Presumed Positive (*)     All other components within normal limits    Narrative:     Results of the Urine Drug Screen should be used only for medical purposes.   ACETAMINOPHEN (TYLENOL),  S - Abnormal; Notable for the following components:    Acetaminophen <2.0 (*)     All other components within normal limits   SALICYLATE, SERUM - Abnormal; Notable for the following components:    Salicylate <1.7 (*)     All other components within normal limits   CBC W/ DIFFERENTIAL - Abnormal; Notable for the following components:    RBC 5.72 (*)     MCV 76.0 (*)     MCH 23.6 (*)     All other components within normal limits   ETHYL ALCOHOL - Normal   CBC WITH DIFFERENTIAL WITH PLATELET    Narrative:     The following orders were created for panel order CBC With Differential With Platelet.  Procedure                               Abnormality         Status                     ---------                               -----------         ------                     CBC W/ DIFFERENTIAL[[id number]]          Abnormal            Final result                 Please view results for these tests on the individual orders.     EKG    Rate, intervals and axes as noted on EKG Report.  Rate: 77  Rhythm: Sinus Rhythm  Reading: Normal sinus rhythm with LVH voltage criteria.                          MDM    Differential diagnose includes but is not limited to cardiac arrhythmia, electrolyte abnormality, anemia, Alcohol intoxication, exacerbation of schizoaffective disorder, medication side effect.  Explained to the family that some new medications can lead to individuals having an escalation in behaviors.  The patient does not have any swelling or Tenderness on examination of his legs.  We will evaluate the patient for any medical condition that may be contributing to his complaints though the mom thinks the patient is somewhat hallucinating.  He has had hallucinations in the past.  Once patient is medically cleared we will have Surjit Ayala evaluate the patient but he is not suicidal or homicidal at this time and family does claim that he is not unsafe.  I will give the patient an oral dose of benzodiazepine to help with anxiety.  The  patient's metabolic panel AST of 39 the rest metabolic and was normal.  Salicylate and acetaminophen were not detected.  Alcohol level not detected.  CBC was essentially normal.  Drug screen was presumptive positive for cannabinoids.  The patient did admit to smoking marijuana.  Patient was medically cleared.  He was evaluated at [facility] and the patient is able to be discharged home.  They will follow-up with her psychiatrist as an outpatient.  Return if symptoms progress or worsen.    Note to Patient  The 21st Century Cures Act makes medical notes like these available to patients in the interest of transparency. However, be advised this is a medical document and is intended as [id number] communication; it is written in medical language and may appear blunt, direct, or contain abbreviations or verbiage that are unfamiliar. Medical documents are intended to carry relevant information, facts as evident, and the clinical opinion of the practitioner.  Patient was evaluated and a screening exam was performed.   As a treating physician attending to the patient, I determined, within reasonable clinical confidence and prior to discharge, that an emergency medical condition was not or was no longer present.  There was no indication for further evaluation, treatment or admission on an emergency basis.  Comprehensive verbal and written discharge and follow-up instructions were provided to help prevent relapse or worsening.  Patient was instructed to follow-up with their primary care provider for further evaluation and treatment, but to return immediately to the ER for worsening, concerning, new, changing or persisting symptoms.  I discussed the case with the patient and they had no questions, complaints, or concerns.  Patient felt comfortable going home.  ^^Please note that this report has been produced using speech recognition software and may contain errors related to that system including, but not limited to, errors  in grammar, punctuation, and spelling, as well as words and phrases that possibly may have been recognized inappropriately.  If there are any questions or concerns, contact the dictating provider for clarification.                   Medical Decision Making      Disposition and Plan     Clinical Impression:  1. Hallucinations    2. Schizoaffective disorder, unspecified type (HCC)         Disposition:  Discharge  2/25/2024  9:40 am    Follow-up:  Werner Phillip MD  [address] [facility] [id number] [phone]    Schedule an appointment as soon as possible for a visit in 1 week(s)            Medications Prescribed:  Current Discharge Medication List

## 2024-03-25 NOTE — ED NOTES
Pt sound asleep, he rouses to verbal stimuli then goes back to sleep. Appears well rested. Resps easy, regular.  States pain 5/10 No Yes

## 2024-04-25 ENCOUNTER — HOSPITAL ENCOUNTER (EMERGENCY)
Facility: HOSPITAL | Age: 29
Discharge: LEFT BEFORE DISCHARGE | End: 2024-04-26
Attending: EMERGENCY MEDICINE
Payer: COMMERCIAL

## 2024-04-25 VITALS
HEIGHT: 70 IN | TEMPERATURE: 98 F | WEIGHT: 221 LBS | SYSTOLIC BLOOD PRESSURE: 126 MMHG | DIASTOLIC BLOOD PRESSURE: 79 MMHG | HEART RATE: 102 BPM | RESPIRATION RATE: 20 BRPM | BODY MASS INDEX: 31.64 KG/M2

## 2024-04-26 NOTE — ED QUICK NOTES
Pt brought back to hallway cart with parents. Parents then left and pt tried to follow a few minutes later and they were already gone. Pt was given a phone called his mom and she came and picked him up from the ER entrance. Pt denied SI/HI.

## 2024-04-26 NOTE — ED INITIAL ASSESSMENT (HPI)
Mom reports pt has not been sleeping for 4 days, \"been pacing.\" Pt DC'd from [facility] on Sunday. States he was given a shot of Paliperidone on Friday and Sunday. He saw his PCP today who thinks pt is having EPS. He denies hallucinations, pt denies SI/HI

## 2024-04-27 PROCEDURE — 93010 ELECTROCARDIOGRAM REPORT: CPT | Performed by: INTERNAL MEDICINE

## 2024-05-25 ENCOUNTER — HOSPITAL ENCOUNTER (EMERGENCY)
Age: 29
Discharge: HOME | End: 2024-05-25
Attending: EMERGENCY MEDICINE

## 2024-05-25 VITALS
HEART RATE: 88 BPM | BODY MASS INDEX: 31.5 KG/M2 | HEIGHT: 70 IN | TEMPERATURE: 98 F | DIASTOLIC BLOOD PRESSURE: 91 MMHG | OXYGEN SATURATION: 93 % | RESPIRATION RATE: 18 BRPM | SYSTOLIC BLOOD PRESSURE: 134 MMHG | WEIGHT: 220 LBS

## 2024-05-25 DIAGNOSIS — R33.9 URINARY RETENTION: Primary | ICD-10-CM

## 2024-05-25 LAB
BILIRUB UR QL STRIP.AUTO: NEGATIVE
CLARITY UR REFRACT.AUTO: CLEAR
COLOR UR AUTO: YELLOW
GLUCOSE UR STRIP.AUTO-MCNC: NEGATIVE MG/DL
KETONES UR STRIP.AUTO-MCNC: NEGATIVE MG/DL
LEUKOCYTE ESTERASE UR QL STRIP.AUTO: NEGATIVE
NITRITE UR QL STRIP.AUTO: NEGATIVE
PH UR STRIP.AUTO: 6.5 [PH] (ref 5–8)
PROT UR STRIP.AUTO-MCNC: NEGATIVE MG/DL
RBC UR QL AUTO: NEGATIVE
SP GR UR STRIP.AUTO: 1.02 (ref 1–1.03)
UROBILINOGEN UR STRIP.AUTO-MCNC: 1 MG/DL

## 2024-05-25 PROCEDURE — 87591 N.GONORRHOEAE DNA AMP PROB: CPT | Performed by: EMERGENCY MEDICINE

## 2024-05-25 PROCEDURE — 87086 URINE CULTURE/COLONY COUNT: CPT | Performed by: EMERGENCY MEDICINE

## 2024-05-25 PROCEDURE — 51702 INSERT TEMP BLADDER CATH: CPT

## 2024-05-25 PROCEDURE — 87491 CHLMYD TRACH DNA AMP PROBE: CPT | Performed by: EMERGENCY MEDICINE

## 2024-05-25 PROCEDURE — 99283 EMERGENCY DEPT VISIT LOW MDM: CPT

## 2024-05-25 PROCEDURE — 81003 URINALYSIS AUTO W/O SCOPE: CPT | Performed by: EMERGENCY MEDICINE

## 2024-05-25 PROCEDURE — 99284 EMERGENCY DEPT VISIT MOD MDM: CPT

## 2024-05-25 RX ORDER — BUPROPION HYDROCHLORIDE 150 MG/1
150 TABLET ORAL DAILY
COMMUNITY

## 2024-05-25 RX ORDER — LIDOCAINE HYDROCHLORIDE 20 MG/ML
5 JELLY TOPICAL ONCE
Status: COMPLETED | OUTPATIENT
Start: 2024-05-25 | End: 2024-05-25

## 2024-05-25 NOTE — ED INITIAL ASSESSMENT (HPI)
Pt to ER for lower abd pain and UTI sxs (frequency, urgency, burning when he urinates, retention--before midnight was the last time he was able to urinate). His family member said he recently started Buproprion and is worried he might be experiencing some of the side effects to the medication.

## 2024-05-25 NOTE — ED QUICK NOTES
Pt speaks to Dr Gustafson about wanting his catheter out before he goes home. Staff explain the risks of urinary retention recurring if he chooses to have us remove it before he follows up with the urologist. Pt verbalized understanding. Pt and family member are aware that he can come back to the ER at any time if his sxs return.

## 2024-05-25 NOTE — ED PROVIDER NOTES
Patient Seen in: [facility] [place]      History     Chief Complaint   Patient presents with    Abdomen/Flank Pain    Urinary Symptoms     Stated Complaint: Abd pain, painful urination, suspects bladder infection though states he's neve*    Subjective:   HPI    Patient arrives lower abdominal pain and difficulty urinating for the last day.    No history of UTIs.  Mom at bedside states he is really is just started on Wellbutrin and wonders if it is a side effect.    Objective:   Past Medical History:    ADHD    Anxiety    Bipolar affective (HCC)              History reviewed. No pertinent surgical history.             Social History     Socioeconomic History    Marital status: Single   Tobacco Use    Smoking status: Never     Passive exposure: Never    Smokeless tobacco: Never   Vaping Use    Vaping status: Never Used   Substance and Sexual Activity    Alcohol use: Not Currently     Alcohol/week: 1.0 - 2.0 standard drink of alcohol     Types: 1 - 2 Cans of beer per week    Drug use: Not Currently     Types: Cannabis     Comment: Pt states \"I have no money to smoke anymore\" MRE a month ago per pt     Social Determinants of Health     Food Insecurity: No Food Insecurity (4/22/2024)    Received from [facility]    Food Insecurity     Currently or in the past 3 months, have you worried your food would run out before you had money to buy more?: No     In the past 12 months, have you run out of food or been unable to get more?: No   Transportation Needs: No Transportation Needs (4/22/2024)    Received from [facility]    Transportation Needs     Medical Transportation Needs?: No    Received from [facility], [facility]    Social Connections    Received from [facility], [facility]    Housing Stability              Review of Systems    Positive for stated complaint: Abd pain, painful  urination, suspects bladder infection though states he's neve*  Other systems are as noted in HPI.  Constitutional and vital signs reviewed.      All other systems reviewed and negative except as noted above.    Physical Exam     ED Triage Vitals   BP    Pulse    Resp    Temp    Temp src    SpO2    O2 Device        Current Vitals:   No data recorded        Physical Exam    Physical Exam   Constitutional: Awake, alert, well appearing  Head: Normocephalic and atraumatic.   Eyes: Conjunctivae are normal. Pupils are equal, round, and reactive to light.   Neck: Normal range of motion. Neck supple.   Cardiovascular: Normal rate, regular rhythm  Pulmonary/Chest: Normal effort.  No accessory muscle use.  No clubbing, no cyanosis.  Abdominal: Lower abdomen notably distended  Neurological: Pt is alert and oriented to person, place, and time. no cranial nerve deficits  Skin: Skin is warm and dry.    ED Course     Labs Reviewed   URINALYSIS WITH CULTURE REFLEX - Abnormal; Notable for the following components:       Result Value    Urobilinogen Urine 1.0 (*)     All other components within normal limits   CHLAMYDIA/GONOCOCCUS, MALENA             Stern catheter was placed and over 1 L of urine returned.         MDM          Differential diagnoses considered: Urinary retention due to medication effect, urethral stricture, BPH/prostatitis less likely.    -Home with Stern  -Would stop Wellbutrin until they discussed with their providing physician, though admittedly it would be a rare side effect of this medication.  -Outpatient urology follow-up for further evaluation  -After Stern catheter placement the patient's abdomen was totally benign even to deep palpation.  CT scan was considered but ultimately deferred as a source of his discomfort was with likely urinary retention.          *Discussion of ongoing management of this patient's care included: n/a  *Comorbidities contributing to the complexity of decision making: n/a  *External  charts reviewed: n/a  *Additional sources of history: n/a    Shared decision making was done by: patient, myself.          05 upon discharge patient stated he wanted the Stern catheter taken out.  He understood the risks including recurrent retention requiring another ER visits, post renal YANETH, infection.  Witnessed by his mom.  They were 50 minutes away and [place], advise and have a low threshold to return if he has ongoing symptoms.                           Medical Decision Making      Disposition and Plan     Clinical Impression:  1. Urinary retention         Disposition:  Discharge  5/25/2024  5:45 am    Follow-up:  Werner Phillip MD  [address] [facility] [address] [phone]          Mitch Butler MD  [address]   [facility] [address]  [facility] [address] [phone]    Follow up            Medications Prescribed:  Current Discharge Medication List

## 2024-05-27 ENCOUNTER — HOSPITAL ENCOUNTER (EMERGENCY)
Age: 29
Discharge: LEFT BEFORE DISCHARGE | End: 2024-05-27

## 2024-05-27 VITALS
SYSTOLIC BLOOD PRESSURE: 120 MMHG | RESPIRATION RATE: 16 BRPM | DIASTOLIC BLOOD PRESSURE: 85 MMHG | OXYGEN SATURATION: 96 % | BODY MASS INDEX: 31.5 KG/M2 | WEIGHT: 220 LBS | HEIGHT: 70 IN | HEART RATE: 84 BPM

## 2024-05-27 DIAGNOSIS — R33.9 URINARY RETENTION: Primary | ICD-10-CM

## 2024-05-27 LAB
C TRACH DNA SPEC QL NAA+PROBE: NEGATIVE
N GONORRHOEA DNA SPEC QL NAA+PROBE: NEGATIVE

## 2024-05-27 RX ORDER — TRAZODONE HYDROCHLORIDE 50 MG/1
50 TABLET ORAL NIGHTLY
COMMUNITY

## 2024-05-27 RX ORDER — BENZTROPINE MESYLATE 1 MG/1
1 TABLET ORAL 2 TIMES DAILY
COMMUNITY

## 2024-05-27 NOTE — ED PROVIDER NOTES
Attempted to see patient, no patient was found in the room I thought the patient was in the restroom unfortunately patient eloped without being seen by provider

## 2024-05-27 NOTE — ED INITIAL ASSESSMENT (HPI)
Was seen here for urinary retention 2 days ago - had catheter placed and was removed same day - here for same complaint

## 2025-01-21 ENCOUNTER — HOSPITAL ENCOUNTER (OUTPATIENT)
Age: 30
Discharge: HOME | End: 2025-01-21
Payer: COMMERCIAL

## 2025-01-21 VITALS
SYSTOLIC BLOOD PRESSURE: 125 MMHG | HEART RATE: 81 BPM | OXYGEN SATURATION: 95 % | RESPIRATION RATE: 20 BRPM | DIASTOLIC BLOOD PRESSURE: 73 MMHG | TEMPERATURE: 99 F

## 2025-01-21 DIAGNOSIS — J06.9 VIRAL URI WITH COUGH: Primary | ICD-10-CM

## 2025-01-21 DIAGNOSIS — R68.89 FLU-LIKE SYMPTOMS: ICD-10-CM

## 2025-01-21 PROBLEM — F90.9 ADULT ATTENTION DEFICIT HYPERACTIVITY DISORDER: Status: ACTIVE | Noted: 2025-01-21

## 2025-01-21 PROBLEM — F25.9 SCHIZOAFFECTIVE DISORDER (HCC): Status: ACTIVE | Noted: 2021-03-31

## 2025-01-21 PROBLEM — F33.1 MODERATE RECURRENT MAJOR DEPRESSION (HCC): Status: ACTIVE | Noted: 2019-09-09

## 2025-01-21 PROBLEM — F90.9 ADHD: Status: ACTIVE | Noted: 2019-06-12

## 2025-01-21 PROBLEM — R44.3 HALLUCINATIONS: Status: ACTIVE | Noted: 2021-05-26

## 2025-01-21 PROBLEM — F10.920 ALCOHOLIC INTOXICATION WITHOUT COMPLICATION: Status: ACTIVE | Noted: 2021-03-31

## 2025-01-21 PROBLEM — F91.3 OPPOSITIONAL DEFIANT DISORDER: Status: ACTIVE | Noted: 2019-08-05

## 2025-01-21 LAB
POCT INFLUENZA A: NEGATIVE
POCT INFLUENZA B: NEGATIVE
SARS-COV-2 RNA RESP QL NAA+PROBE: NOT DETECTED

## 2025-01-21 PROCEDURE — 99203 OFFICE O/P NEW LOW 30 MIN: CPT | Performed by: NURSE PRACTITIONER

## 2025-01-21 PROCEDURE — U0002 COVID-19 LAB TEST NON-CDC: HCPCS | Performed by: NURSE PRACTITIONER

## 2025-01-21 PROCEDURE — 87502 INFLUENZA DNA AMP PROBE: CPT | Performed by: NURSE PRACTITIONER

## 2025-01-21 RX ORDER — FLUTICASONE PROPIONATE 50 MCG
2 SPRAY, SUSPENSION (ML) NASAL DAILY
Qty: 16 G | Refills: 0 | Status: SHIPPED | OUTPATIENT
Start: 2025-01-21 | End: 2025-02-20

## 2025-01-21 RX ORDER — QUETIAPINE FUMARATE 100 MG/1
TABLET, FILM COATED ORAL
COMMUNITY
Start: 2025-01-08

## 2025-01-21 NOTE — ED PROVIDER NOTES
Patient Seen in: [facility]      History     Chief Complaint   Patient presents with    Cough/URI    Sneezing     Stated Complaint: sore throat; cough; runny nose    Subjective:   30 yo male presents with complaint of cough, sneezing and runny nose for 2 days.   OTC's include Vicks Vapor Rub.   Pt currently lives in hotel, so no known exposures.   No hx of asthma.     Pt denies fever, chills, sore throat, shortness of breath, chest pain, nausea, vomiting or diarrhea.         The history is provided by the patient.         Objective:     Past Medical History:    ADHD    Anxiety    Bipolar affective (HCC)              History reviewed. No pertinent surgical history.             Social History     Socioeconomic History    Marital status: Single   Tobacco Use    Smoking status: Never     Passive exposure: Never    Smokeless tobacco: Never   Vaping Use    Vaping status: Never Used   Substance and Sexual Activity    Alcohol use: Not Currently     Alcohol/week: 1.0 - 2.0 standard drink of alcohol     Types: 1 - 2 Cans of beer per week    Drug use: Not Currently     Types: Cannabis     Comment: Pt states \"I have no money to smoke anymore\" MRE a month ago per pt     Social Drivers of Health     Food Insecurity: No Food Insecurity (4/22/2024)    Received from [facility]    Food Insecurity     Currently or in the past 3 months, have you worried your food would run out before you had money to buy more?: No     In the past 12 months, have you run out of food or been unable to get more?: No   Transportation Needs: No Transportation Needs (4/22/2024)    Received from [facility]    Transportation Needs     Currently or in the past 3 months, has lack of transportation kept you from medical appointments, getting food or medicine, or providing care to a family member?: Unrecognized value     Medical Transportation Needs?: No    Received from [facility], [facility]  [facility]    Social Connections    Received from [facility], [facility]    Housing Stability              Review of Systems   Constitutional:  Negative for chills and fever.   HENT:  Positive for congestion and rhinorrhea. Negative for trouble swallowing.    Respiratory:  Positive for cough. Negative for shortness of breath and wheezing.    Gastrointestinal:  Negative for abdominal pain, diarrhea, nausea and vomiting.       Positive for stated complaint: sore throat; cough; runny nose  Other systems are as noted in HPI.  Constitutional and vital signs reviewed.      All other systems reviewed and negative except as noted above.    Physical Exam     ED Triage Vitals [01/21/25 1158]   /73   Pulse 81   Resp 20   Temp 99.1 °F (37.3 °C)   Temp src Oral   SpO2 95 %   O2 Device None (Room air)       Current Vitals:   Vital Signs  BP: 125/73  Pulse: 81  Resp: 20  Temp: 99.1 °F (37.3 °C)  Temp src: Oral    Oxygen Therapy  SpO2: 95 %  O2 Device: None (Room air)        Physical Exam  Vitals and nursing note reviewed.   Constitutional:       General: He is not in acute distress.     Appearance: Normal appearance. He is not ill-appearing, toxic-appearing or diaphoretic.   HENT:      Head: Normocephalic.      Right Ear: Tympanic membrane normal.      Left Ear: Tympanic membrane normal.      Nose: Congestion and rhinorrhea present.      Mouth/Throat:      Mouth: Mucous membranes are moist.      Pharynx: Oropharynx is clear. No oropharyngeal exudate or posterior oropharyngeal erythema.   Eyes:      Conjunctiva/sclera: Conjunctivae normal.   Cardiovascular:      Rate and Rhythm: Normal rate and regular rhythm.      Heart sounds: No murmur heard.  Pulmonary:      Effort: Pulmonary effort is normal. No respiratory distress.      Breath sounds: Normal breath sounds. No stridor. No wheezing, rhonchi or rales.   Musculoskeletal:      Cervical back: Normal range of motion and  neck supple.   Lymphadenopathy:      Cervical: No cervical adenopathy.   Skin:     General: Skin is warm and dry.      Findings: No rash.   Neurological:      Mental Status: He is alert.   Psychiatric:         Mood and Affect: Mood normal.             ED Course     Labs Reviewed   RAPID SARS-COV-2 BY PCR - Normal   POCT FLU TEST - Normal    Narrative:     This assay is a rapid molecular in vitro test utilizing nucleic acid amplification of influenza A and B viral RNA.        MDM      Medical Decision Making  30 yo male presents with complaint of cough, sneezing and runny nose for 2 days.   Differential diagnoses include influenza, COVID, viral URI, allergic rhinitis.  On exam patient is well-appearing with normal vitals, lungs clear bilaterally.  COVID and influenza results are negative.  Recommend nasal saline, Flonase, Robitussin okay, steam during shower, push fluids.  Follow-up if not improving as anticipated in the next week, sooner if worsening.  ED precautions discussed with patient.  Patient agreeable to plan.    Amount and/or Complexity of Data Reviewed  External Data Reviewed: notes.  Labs: ordered.    Risk  OTC drugs.        Disposition and Plan     Clinical Impression:  1. Viral URI with cough    2. Flu-like symptoms         Disposition:  Discharge  1/21/2025 12:24 pm    Follow-up:  Werner Phillip MD  [address] [facility] [address] [phone]    In 1 week  If symptoms worsen          Medications Prescribed:  Discharge Medication List as of 1/21/2025 12:25 PM        START taking these medications    Details   fluticasone propionate 50 MCG/ACT Nasal Suspension 2 sprays by Nasal route daily., Normal, Disp-16 g, R-0                 Supplementary Documentation:

## 2025-01-21 NOTE — DISCHARGE INSTRUCTIONS
Covid and Influenza tests are negative.     We recommend:     - Cough suppressant such as (Delsym cough syrup or Robitussin is ok)   - Nasal saline - either spray (\"Ocean\" brand) or Loi Med Sinus Rinse 3 times a day  Flonase: 2 sprays to each nostril in the AM.  - Rest and push fluids  - Hot lemon tea with honey  - Steam twice a day (either in shower or with cup of hot water and a towel over your head)     Please follow up with your primary care doctor in 7 days, sooner if worsening.